# Patient Record
Sex: FEMALE | Race: WHITE | Employment: UNEMPLOYED | URBAN - METROPOLITAN AREA
[De-identification: names, ages, dates, MRNs, and addresses within clinical notes are randomized per-mention and may not be internally consistent; named-entity substitution may affect disease eponyms.]

---

## 2017-09-21 PROBLEM — R07.9 CHEST PAIN: Status: ACTIVE | Noted: 2017-09-21

## 2021-08-10 ENCOUNTER — HOSPITAL ENCOUNTER (EMERGENCY)
Age: 81
Discharge: HOME | End: 2021-08-10
Payer: MEDICARE

## 2021-08-10 VITALS
HEIGHT: 66 IN | OXYGEN SATURATION: 94 % | HEART RATE: 72 BPM | RESPIRATION RATE: 16 BRPM | TEMPERATURE: 97.5 F | WEIGHT: 135 LBS | DIASTOLIC BLOOD PRESSURE: 77 MMHG | SYSTOLIC BLOOD PRESSURE: 179 MMHG | BODY MASS INDEX: 21.69 KG/M2

## 2021-08-10 DIAGNOSIS — S05.01XA ABRASION OF RIGHT CORNEA, INITIAL ENCOUNTER: Primary | ICD-10-CM

## 2021-08-10 PROCEDURE — 6370000000 HC RX 637 (ALT 250 FOR IP): Performed by: PHYSICIAN ASSISTANT

## 2021-08-10 PROCEDURE — 99284 EMERGENCY DEPT VISIT MOD MDM: CPT

## 2021-08-10 RX ORDER — POLYMYXIN B SULFATE AND TRIMETHOPRIM 1; 10000 MG/ML; [USP'U]/ML
1 SOLUTION OPHTHALMIC EVERY 6 HOURS
Qty: 10 ML | Refills: 0 | Status: SHIPPED | OUTPATIENT
Start: 2021-08-10 | End: 2021-08-15

## 2021-08-10 RX ORDER — TETRACAINE HYDROCHLORIDE 5 MG/ML
2 SOLUTION OPHTHALMIC ONCE
Status: COMPLETED | OUTPATIENT
Start: 2021-08-10 | End: 2021-08-10

## 2021-08-10 RX ADMIN — TETRACAINE HYDROCHLORIDE 2 DROP: 5 SOLUTION/ DROPS OPHTHALMIC at 20:24

## 2021-08-10 RX ADMIN — FLUORESCEIN SODIUM 1 EACH: 0.6 STRIP OPHTHALMIC at 20:25

## 2021-08-10 ASSESSMENT — PAIN SCALES - GENERAL: PAINLEVEL_OUTOF10: 7

## 2021-08-11 NOTE — ED PROVIDER NOTES
[facility]  Department of Emergency Medicine   ED  Encounter Note  Admit Date/RoomTime: 8/10/2021  7:18 PM  ED Room: 32/    NAME: Clark Moore  : 1940  MRN: [id number]     Chief Complaint:  Eye Pain (right eye pain and swelling since 1400)    History of Present Illness        Clark Moore is a 78 y.o. old female presenting to the emergency department by private vehicle, for non-traumatic persistent pain and tearing to right eye, which began this afternoom hour(s) prior to arrival.  There has been no obvious mechanism causing complaint. Her daughter reports that patient started complaining of the pain after she woke up. Since onset her symptoms have been persistent and moderate in severity. Associated signs & symptoms of: blurry vision. The patients tetanus status is up to date. Patient has difficulty communicating due to dementia. Daughter is at bedside. She reports that her mother lives with her father and her father did give the patient some eyedrops from surgery she had 5 years ago, she has some with her and his prednisolone. She only had one dose today. Circumstances:    []  Contact Lens Use     []  Recent URI Sx's     []  Spontaneous Onset     []  Close Contact w/similar Sx's     []  Work Related     History of:     []   Glaucoma     []   Recent Eye Surgery     ROS   Pertinent positives and negatives are stated within HPI, all other systems reviewed and are negative. Past Medical History:  has a past medical history of Endometriosis, Kidney stones, Kidney stones, and Palpitations. Surgical History:  has a past surgical history that includes Appendectomy; Tonsillectomy; Ovary removal; hernia repair; and Upper gastrointestinal endoscopy (2017). Social History:  reports that she quit smoking about 60 years ago. Her smoking use included cigarettes. She has a 20.00 pack-year smoking history.  She has never used smokeless tobacco. She reports current alcohol use. She reports that she does not use drugs. Family History: family history includes Alzheimer's Disease in her mother; Cancer in her sister; Diabetes in her sister; Mental Illness in her father. Allergies: Demerol hcl [meperidine] and Ultram [tramadol]    Physical Exam   Oxygen Saturation Interpretation: Normal.        ED Triage Vitals [08/10/21 2000]   BP Temp Temp Source Pulse Resp SpO2 Height Weight   (!) 179/77 97.5 °F (36.4 °C) Temporal 72 16 94 % 5' 6\" (1.676 m) 135 lb (61.2 kg)         Constitutional:  Alert, development consistent with age. HENT:  NC/NT. Airway patent. Neck:  Normal ROM. Supple. Eyes:         Pupils: equal, round, reactive to light and accommodation. Eyelids: Bilateral upper and lower Swelling/redness:  no swelling or erythema. Conjunctiva: Right slight injection. Sclera: Bilateral normal appearing. Cornea: Right an abrasion is present which is approximately 4 mm at the 1 o'clock position. EOM:  Intact Bilaterally. Fundoscopic:  not well visualized. Visual Acuity: wnl  IOP right 17, left 14. Integument:  No rashes, erythema present, unless noted elsewhere. Lymphatics: No lymphangitis or adenopathy noted. Neurological:  Oriented. Motor functions intact. Lab / Imaging Results   (All laboratory and radiology results have been personally reviewed by myself)  Labs:  No results found for this visit on 08/10/21. Imaging: All Radiology results interpreted by Radiologist unless otherwise noted.   No orders to display       ED Course / Medical Decision Making     Medications   fluorescein ophthalmic strip 1 each (1 each Right Eye Given 8/10/21 2025)   tetracaine (TETRAVISC) 0.5 % ophthalmic solution 2 drop (2 drops Ophthalmic Given 8/10/21 2024)        Re-examination:  8/11/21       Time:pt had immediate pain relief with tetracaine    Consult(s):   None    Procedure(s):  SLIT LAMP EXAM:  Performed By: Stone Hilliard Carson Melchor PA-C. Right Eye: Cornea: an abrasion is present which is approximately 4 mm at the 1 o'clock position. Flourescein stain: Positive. Anterior chamber: normal.         MDM:   Patient presents to emergency with right eye pain. Minimal visual changes associated. Slit-lamp exam shows a fairly large corneal abrasion approximately 1:00 region of the right cornea. Patient probably scratched herself, either during sleep or without realizing it, as she does have fairly long fingernails. Intraocular pressures within normal limits. Patient will be discharged with antibiotics and follow-up with eye doctor tomorrow. Plan of Care/Counseling:  Alise Saldana PA-C reviewed today's visit with the patient and mother in addition to providing specific details for the plan of care and counseling regarding the diagnosis and prognosis. Questions are answered at this time and are agreeable with the plan. Assessment      1. Abrasion of right cornea, initial encounter      Plan   Discharged home. Patient condition is stable    New Medications     Discharge Medication List as of 8/10/2021  9:07 PM      START taking these medications    Details   trimethoprim-polymyxin b (POLYTRIM) 57357-9.1 UNIT/ML-% ophthalmic solution Place 1 drop into the right eye every 6 hours for 5 days, Disp-10 mL, R-0Normal           Electronically signed by Alise Saldana PA-C   DD: 8/11/21  **This report was transcribed using voice recognition software. Every effort was made to ensure accuracy; however, inadvertent computerized transcription errors may be present.   END OF ED PROVIDER NOTE       Alise Saldana PA-C  08/11/21 0335

## 2021-09-29 VITALS
RESPIRATION RATE: 16 BRPM | SYSTOLIC BLOOD PRESSURE: 173 MMHG | BODY MASS INDEX: 20.89 KG/M2 | DIASTOLIC BLOOD PRESSURE: 76 MMHG | OXYGEN SATURATION: 97 % | HEART RATE: 75 BPM | WEIGHT: 130 LBS | TEMPERATURE: 97.8 F | HEIGHT: 66 IN

## 2021-09-30 ENCOUNTER — HOSPITAL ENCOUNTER (EMERGENCY)
Age: 81
Discharge: LEFT BEFORE DISCHARGE | End: 2021-09-30
Payer: MEDICARE

## 2021-09-30 NOTE — ED NOTES
FIRST PROVIDER CONTACT ASSESSMENT NOTE      Department of Emergency Medicine   9/29/21  8:15 PM EDT    Chief Complaint: Swallowed Foreign Body (feels like pills are stuck in throat since taking meds Monday. Has been able to eat pudding.)      History of Present Illness:   Carlene Lindsay is a 78 y.o. female who presents to the ED for swelling to vitamin C pills on Monday. She states that it felt like it got stuck. She has been since then eating pudding. Denies any drooling. Denies any vomiting. Nuys any difficulties breathing. Medical History:  has a past medical history of Endometriosis, Kidney stones, Kidney stones, and Palpitations. Surgical History:  has a past surgical history that includes Appendectomy; Tonsillectomy; Ovary removal; hernia repair; and Upper gastrointestinal endoscopy (09/21/2017). Social History:  reports that she quit smoking about 60 years ago. Her smoking use included cigarettes. She has a 20.00 pack-year smoking history. She has never used smokeless tobacco. She reports current alcohol use. She reports that she does not use drugs. Family History: family history includes Alzheimer's Disease in her mother; Cancer in her sister; Diabetes in her sister; Mental Illness in her father.     *ALLERGIES*     Demerol hcl [meperidine] and Ultram [tramadol]     Physical Exam:      VS:  BP (!) 173/76   Pulse 75   Temp 97.8 °F (36.6 °C)   Resp 16   Ht 5' 6\" (1.676 m)   Wt 130 lb (59 kg)   LMP  (LMP Unknown)   SpO2 97%   BMI 20.98 kg/m²      Initial Plan of Care:  Initiate Treatment-Testing, Proceed toTreatment Area When Bed Available for ED Attending/MLP to Continue Care    -----------------END OF FIRST PROVIDER CONTACT ASSESSMENT NOTE--------------  Electronically signed by ANGELITO Lopez CNP   DD: 9/29/21             ANGELITO Lopez CNP  09/29/21 2017

## 2022-08-17 ENCOUNTER — HOSPITAL ENCOUNTER (EMERGENCY)
Age: 82
Discharge: HOME | End: 2022-08-17
Attending: EMERGENCY MEDICINE
Payer: MEDICARE

## 2022-08-17 ENCOUNTER — APPOINTMENT (OUTPATIENT)
Dept: CT IMAGING | Age: 82
End: 2022-08-17
Payer: MEDICARE

## 2022-08-17 VITALS
HEART RATE: 69 BPM | RESPIRATION RATE: 16 BRPM | DIASTOLIC BLOOD PRESSURE: 62 MMHG | BODY MASS INDEX: 15.82 KG/M2 | OXYGEN SATURATION: 96 % | TEMPERATURE: 97.6 F | SYSTOLIC BLOOD PRESSURE: 114 MMHG | WEIGHT: 98 LBS

## 2022-08-17 DIAGNOSIS — S01.01XA LACERATION OF SCALP, INITIAL ENCOUNTER: Primary | ICD-10-CM

## 2022-08-17 DIAGNOSIS — Z91.81 HISTORY OF RECENT FALL: ICD-10-CM

## 2022-08-17 DIAGNOSIS — S09.90XA CLOSED HEAD INJURY, INITIAL ENCOUNTER: ICD-10-CM

## 2022-08-17 LAB
AMORPHOUS: ABNORMAL
ANION GAP SERPL CALCULATED.3IONS-SCNC: 10 MMOL/L (ref 7–16)
BACTERIA: ABNORMAL /HPF
BASOPHILS ABSOLUTE: 0.05 E9/L (ref 0–0.2)
BASOPHILS RELATIVE PERCENT: 0.7 % (ref 0–2)
BILIRUBIN URINE: NEGATIVE
BLOOD, URINE: NEGATIVE
BUN BLDV-MCNC: 11 MG/DL (ref 6–23)
CALCIUM SERPL-MCNC: 9.5 MG/DL (ref 8.6–10.2)
CHLORIDE BLD-SCNC: 104 MMOL/L (ref 98–107)
CLARITY: CLEAR
CO2: 26 MMOL/L (ref 22–29)
COLOR: YELLOW
CREAT SERPL-MCNC: 1 MG/DL (ref 0.5–1)
EOSINOPHILS ABSOLUTE: 0.07 E9/L (ref 0.05–0.5)
EOSINOPHILS RELATIVE PERCENT: 1 % (ref 0–6)
EPITHELIAL CELLS, UA: ABNORMAL /HPF
GFR AFRICAN AMERICAN: >60
GFR NON-AFRICAN AMERICAN: 53 ML/MIN/1.73
GLUCOSE BLD-MCNC: 116 MG/DL (ref 74–99)
GLUCOSE URINE: NEGATIVE MG/DL
HCT VFR BLD CALC: 41.4 % (ref 34–48)
HEMOGLOBIN: 14.2 G/DL (ref 11.5–15.5)
IMMATURE GRANULOCYTES #: 0.03 E9/L
IMMATURE GRANULOCYTES %: 0.4 % (ref 0–5)
KETONES, URINE: NEGATIVE MG/DL
LACTIC ACID: 1 MMOL/L (ref 0.5–2.2)
LEUKOCYTE ESTERASE, URINE: NEGATIVE
LYMPHOCYTES ABSOLUTE: 0.7 E9/L (ref 1.5–4)
LYMPHOCYTES RELATIVE PERCENT: 9.8 % (ref 20–42)
MCH RBC QN AUTO: 32.1 PG (ref 26–35)
MCHC RBC AUTO-ENTMCNC: 34.3 % (ref 32–34.5)
MCV RBC AUTO: 93.5 FL (ref 80–99.9)
MONOCYTES ABSOLUTE: 0.49 E9/L (ref 0.1–0.95)
MONOCYTES RELATIVE PERCENT: 6.9 % (ref 2–12)
NEUTROPHILS ABSOLUTE: 5.8 E9/L (ref 1.8–7.3)
NEUTROPHILS RELATIVE PERCENT: 81.2 % (ref 43–80)
NITRITE, URINE: NEGATIVE
PDW BLD-RTO: 13.8 FL (ref 11.5–15)
PH UA: 7 (ref 5–9)
PLATELET # BLD: 174 E9/L (ref 130–450)
PMV BLD AUTO: 9.8 FL (ref 7–12)
POTASSIUM SERPL-SCNC: 3.8 MMOL/L (ref 3.5–5)
PROTEIN UA: NEGATIVE MG/DL
RBC # BLD: 4.43 E12/L (ref 3.5–5.5)
RBC UA: ABNORMAL /HPF (ref 0–2)
SODIUM BLD-SCNC: 140 MMOL/L (ref 132–146)
SPECIFIC GRAVITY UA: 1.01 (ref 1–1.03)
TROPONIN, HIGH SENSITIVITY: 12 NG/L (ref 0–9)
TROPONIN, HIGH SENSITIVITY: 14 NG/L (ref 0–9)
UROBILINOGEN, URINE: 2 E.U./DL
WBC # BLD: 7.1 E9/L (ref 4.5–11.5)
WBC UA: ABNORMAL /HPF (ref 0–5)

## 2022-08-17 PROCEDURE — 85025 COMPLETE CBC W/AUTO DIFF WBC: CPT

## 2022-08-17 PROCEDURE — 12001 RPR S/N/AX/GEN/TRNK 2.5CM/<: CPT

## 2022-08-17 PROCEDURE — 93005 ELECTROCARDIOGRAM TRACING: CPT | Performed by: EMERGENCY MEDICINE

## 2022-08-17 PROCEDURE — 72192 CT PELVIS W/O DYE: CPT

## 2022-08-17 PROCEDURE — 72125 CT NECK SPINE W/O DYE: CPT

## 2022-08-17 PROCEDURE — 6360000002 HC RX W HCPCS: Performed by: EMERGENCY MEDICINE

## 2022-08-17 PROCEDURE — 70450 CT HEAD/BRAIN W/O DYE: CPT

## 2022-08-17 PROCEDURE — 81001 URINALYSIS AUTO W/SCOPE: CPT

## 2022-08-17 PROCEDURE — 2580000003 HC RX 258: Performed by: EMERGENCY MEDICINE

## 2022-08-17 PROCEDURE — 90714 TD VACC NO PRESV 7 YRS+ IM: CPT | Performed by: EMERGENCY MEDICINE

## 2022-08-17 PROCEDURE — 90471 IMMUNIZATION ADMIN: CPT | Performed by: EMERGENCY MEDICINE

## 2022-08-17 PROCEDURE — 84484 ASSAY OF TROPONIN QUANT: CPT

## 2022-08-17 PROCEDURE — 80048 BASIC METABOLIC PNL TOTAL CA: CPT

## 2022-08-17 PROCEDURE — 6370000000 HC RX 637 (ALT 250 FOR IP): Performed by: EMERGENCY MEDICINE

## 2022-08-17 PROCEDURE — 36415 COLL VENOUS BLD VENIPUNCTURE: CPT

## 2022-08-17 PROCEDURE — 99284 EMERGENCY DEPT VISIT MOD MDM: CPT

## 2022-08-17 PROCEDURE — 83605 ASSAY OF LACTIC ACID: CPT

## 2022-08-17 PROCEDURE — 72131 CT LUMBAR SPINE W/O DYE: CPT

## 2022-08-17 RX ORDER — SODIUM CHLORIDE 9 MG/ML
500 INJECTION, SOLUTION INTRAVENOUS CONTINUOUS
Status: DISCONTINUED | OUTPATIENT
Start: 2022-08-17 | End: 2022-08-18 | Stop reason: HOSPADM

## 2022-08-17 RX ORDER — TETANUS AND DIPHTHERIA TOXOIDS ADSORBED 2; 2 [LF]/.5ML; [LF]/.5ML
0.5 INJECTION INTRAMUSCULAR ONCE
Status: COMPLETED | OUTPATIENT
Start: 2022-08-17 | End: 2022-08-17

## 2022-08-17 RX ORDER — ACETAMINOPHEN 160 MG/5ML
480 SOLUTION ORAL ONCE
Status: COMPLETED | OUTPATIENT
Start: 2022-08-17 | End: 2022-08-17

## 2022-08-17 RX ADMIN — ACETAMINOPHEN 480 MG: 650 SOLUTION ORAL at 19:05

## 2022-08-17 RX ADMIN — TETANUS AND DIPHTHERIA TOXOIDS ADSORBED 0.5 ML: 2; 2 INJECTION INTRAMUSCULAR at 19:08

## 2022-08-17 RX ADMIN — SODIUM CHLORIDE 500 ML: 9 INJECTION, SOLUTION INTRAVENOUS at 19:00

## 2022-08-17 ASSESSMENT — PAIN DESCRIPTION - LOCATION
LOCATION: HEAD
LOCATION: HEAD;NECK

## 2022-08-17 ASSESSMENT — PAIN SCALES - GENERAL: PAINLEVEL_OUTOF10: 8

## 2022-08-17 ASSESSMENT — PAIN - FUNCTIONAL ASSESSMENT: PAIN_FUNCTIONAL_ASSESSMENT: 0-10

## 2022-08-17 ASSESSMENT — PAIN DESCRIPTION - DESCRIPTORS: DESCRIPTORS: SORE

## 2022-08-17 ASSESSMENT — PAIN DESCRIPTION - PAIN TYPE: TYPE: ACUTE PAIN

## 2022-08-17 NOTE — ED PROVIDER NOTES
HPI:  8/17/22, Time: 6:20 PM EDT        Dawit Sharma is a 78 y.o. female presenting to the ED for evaluation of head injury which occurred acutely from a fall at home, beginning 1 hour ago. Patient is here with her daughter who provides most of the historical detail as the patient has a history of documented dementia. Daughter states that the patient stepped on a scale which caused her to lose her balance falling backward. Daughter states that the patient   was alone at the time of the accident so it is undetermined whether the patient had any true loss of consciousness. Per daughter's report, the patient is back to her baseline level of functioning in terms of mental status. The patient did sustain a laceration of the posterior scalp and the patient has voiced complaints regarding intermittent neck pain. Patient is not had any complaints of back pain and denies any back pain currently, no abdominal pain, no nausea/vomiting, no change in bowel/bladder habit patterns reported, no extremity injuries reported. Patient's tetanus status is unknown. The complaint has been persistent, moderate in severity, and worsened by palpation of the scalp. No relieving factors are reported    Review of Systems:   A complete review of systems was performed and pertinent positives and negatives are stated within HPI, all other systems reviewed and are negative.    --------------------------------------------- PAST HISTORY ---------------------------------------------  Past Medical History:  has a past medical history of Endometriosis, Frontal lobe dementia ([facility] [address] Kidney stones, Kidney stones, Palpitations, and Thyroid disease. Past Surgical History:  has a past surgical history that includes Appendectomy; Tonsillectomy; Ovary removal; hernia repair; and Upper gastrointestinal endoscopy (09/21/2017). Social History:  reports that she quit smoking about 61 years ago. Her smoking use included cigarettes.  She has a 20.00 pack-year smoking history. She has never used smokeless tobacco. She reports current alcohol use. She reports that she does not use drugs. Family History: family history includes Alzheimer's Disease in her mother; Cancer in her sister; Diabetes in her sister; Mental Illness in her father. The patients home medications have been reviewed.     Allergies: Demerol hcl [meperidine] and Ultram [tramadol]    -------------------------------------------------- RESULTS -------------------------------------------------  All laboratory and radiology results have been personally reviewed by myself   LABS:  Results for orders placed or performed during the hospital encounter of 08/17/22   Urinalysis with Microscopic   Result Value Ref Range    Color, UA Yellow Straw/Yellow    Clarity, UA Clear Clear    Glucose, Ur Negative Negative mg/dL    Bilirubin Urine Negative Negative    Ketones, Urine Negative Negative mg/dL    Specific Gravity, UA 1.015 1.005 - 1.030    Blood, Urine Negative Negative    pH, UA 7.0 5.0 - 9.0    Protein, UA Negative Negative mg/dL    Urobilinogen, Urine 2.0 (A) <2.0 E.U./dL    Nitrite, Urine Negative Negative    Leukocyte Esterase, Urine Negative Negative    WBC, UA 0-1 0 - 5 /HPF    RBC, UA 0-1 0 - 2 /HPF    Epithelial Cells, UA RARE /HPF    Bacteria, UA RARE (A) None Seen /HPF    Amorphous, UA MODERATE    Troponin   Result Value Ref Range    Troponin, High Sensitivity 12 (H) 0 - 9 ng/L   CBC with Auto Differential   Result Value Ref Range    WBC 7.1 4.5 - 11.5 E9/L    RBC 4.43 3.50 - 5.50 E12/L    Hemoglobin 14.2 11.5 - 15.5 g/dL    Hematocrit 41.4 34.0 - 48.0 %    MCV 93.5 80.0 - 99.9 fL    MCH 32.1 26.0 - 35.0 pg    MCHC 34.3 32.0 - 34.5 %    RDW 13.8 11.5 - 15.0 fL    Platelets 181 642 - 733 E9/L    MPV 9.8 7.0 - 12.0 fL    Neutrophils % 81.2 (H) 43.0 - 80.0 %    Immature Granulocytes % 0.4 0.0 - 5.0 %    Lymphocytes % 9.8 (L) 20.0 - 42.0 %    Monocytes % 6.9 2.0 - 12.0 %    Eosinophils % 1.0 0.0 - 6.0 %    Basophils % 0.7 0.0 - 2.0 %    Neutrophils Absolute 5.80 1.80 - 7.30 E9/L    Immature Granulocytes # 0.03 E9/L    Lymphocytes Absolute 0.70 (L) 1.50 - 4.00 E9/L    Monocytes Absolute 0.49 0.10 - 0.95 E9/L    Eosinophils Absolute 0.07 0.05 - 0.50 E9/L    Basophils Absolute 0.05 0.00 - 0.20 J9/L   Basic Metabolic Panel   Result Value Ref Range    Sodium 140 132 - 146 mmol/L    Potassium 3.8 3.5 - 5.0 mmol/L    Chloride 104 98 - 107 mmol/L    CO2 26 22 - 29 mmol/L    Anion Gap 10 7 - 16 mmol/L    Glucose 116 (H) 74 - 99 mg/dL    BUN 11 6 - 23 mg/dL    Creatinine 1.0 0.5 - 1.0 mg/dL    GFR Non-African American 53 >=60 mL/min/1.73    GFR African American >60     Calcium 9.5 8.6 - 10.2 mg/dL   Lactic Acid   Result Value Ref Range    Lactic Acid 1.0 0.5 - 2.2 mmol/L   Troponin   Result Value Ref Range    Troponin, High Sensitivity 14 (H) 0 - 9 ng/L   EKG 12 Lead   Result Value Ref Range    Ventricular Rate 66 BPM    Atrial Rate 66 BPM    P-R Interval 166 ms    QRS Duration 92 ms    Q-T Interval 436 ms    QTc Calculation (Bazett) 457 ms    P Axis 70 degrees    R Axis 51 degrees    T Axis 53 degrees       RADIOLOGY:  Interpreted by Radiologist.  CT HEAD WO CONTRAST   Final Result   No acute intracranial abnormality. There is age-appropriate atrophy and   small-vessel ischemic disease. CT lumbar spine. There is no acute fracture or dislocation in the lumbar spine. There is   diffuse degenerative changes from L1-S1 with multilevel disc bulges. There   is calcification of the aorta. Impression      No acute fractures. Diffuse degenerative changes from L1-S1. CT pelvis. .      There is no acute displaced fracture in the pelvis. Mild degenerative   changes are noted in the hips and lumbosacral spine. Scattered   diverticulosis are identified in the colon. Impression      No acute displaced pelvic fractures.          CT CERVICAL SPINE WO CONTRAST   Final Result   No acute abnormality of the cervical spine. Mild-moderate cervical spondylosis. CT PELVIS WO CONTRAST Additional Contrast? None   Final Result   No acute intracranial abnormality. There is age-appropriate atrophy and   small-vessel ischemic disease. CT lumbar spine. There is no acute fracture or dislocation in the lumbar spine. There is   diffuse degenerative changes from L1-S1 with multilevel disc bulges. There   is calcification of the aorta. Impression      No acute fractures. Diffuse degenerative changes from L1-S1. CT pelvis. .      There is no acute displaced fracture in the pelvis. Mild degenerative   changes are noted in the hips and lumbosacral spine. Scattered   diverticulosis are identified in the colon. Impression      No acute displaced pelvic fractures. CT LUMBAR SPINE WO CONTRAST   Final Result   No acute intracranial abnormality. There is age-appropriate atrophy and   small-vessel ischemic disease. CT lumbar spine. There is no acute fracture or dislocation in the lumbar spine. There is   diffuse degenerative changes from L1-S1 with multilevel disc bulges. There   is calcification of the aorta. Impression      No acute fractures. Diffuse degenerative changes from L1-S1. CT pelvis. .      There is no acute displaced fracture in the pelvis. Mild degenerative   changes are noted in the hips and lumbosacral spine. Scattered   diverticulosis are identified in the colon. Impression      No acute displaced pelvic fractures. ------------------------- NURSING NOTES AND VITALS REVIEWED ---------------------------   The nursing notes within the ED encounter and vital signs as below have been reviewed.    /62   Pulse 69   Temp 97.6 °F (36.4 °C) (Temporal)   Resp 16   Wt 98 lb (44.5 kg)   LMP  (LMP Unknown)   SpO2 96%   BMI 15.82 kg/m²   Oxygen Saturation Interpretation: Normal      ---------------------------------------------------PHYSICAL EXAM--------------------------------------      Constitutional/General: Alert and oriented x person, elderly, frail appearing, non toxic in mild distress  Head:   2.5 cm scalp laceration R Occiput; no raccoon eye sign, no warner sign, no scalp hematomas, no hemotympanum  Eyes: PERRL, EOMI no subconjunctival hemorrhage  Mouth: Oropharynx clear, handling secretions, no trismus  Neck: Supple, full ROM, mild paracervical tenderness but no true midline vertebral tenderness, no step-off no crepitus  Pulmonary: Lungs clear to auscultation bilaterally, no wheezes, rales, or rhonchi. Not in respiratory distress; no chest wall tenderness during auscultation  Cardiovascular:  Regular rate and rhythm, no murmurs, gallops, or rubs. 2+ distal pulses  GI: Abdomen soft non tender, non distended, no organomegaly no masses no guarding or rigidity normal active bowel sounds  Extremities: Moves all extremities x 4. Warm and well perfused; no clubbing, no cyanosis, no edema  Skin: warm and dry without rash  Neurologic: GCS 15, cranial nerves II through XII grossly intact with no focal neurologic deficits. Patient appears to be at baseline mental status (confirmed by her daughter)  Psych: Normal Affect      ------------------------------ ED COURSE/MEDICAL DECISION MAKING----------------------  Medications   0.9 % sodium chloride infusion (500 mLs IntraVENous New Bag 8/17/22 1900)   acetaminophen (TYLENOL) 160 MG/5ML solution 480 mg (480 mg Oral Given 8/17/22 1905)   diptheria-tetanus toxoids [facility]) 2-2 LF/0.5ML injection 0.5 mL (0.5 mLs IntraMUSCular Given 8/17/22 1908)         ED COURSE:     Medical Decision Making:   Differential diagnoses includes:  Closed head injury/concussion, medic SAH, subdural hematoma, epidural hematoma, cervical tibial fracture, lumbar vertebral fracture, occult pelvic fracture, to name a few.   CT head/C-spine/lumbar spine/pelvis studies were negative for any evidence of any fractures or any acute process. The patient is felt to be stable appropriate discharge close outpatient follow-up. PROCEDURE NOTE  8/17/22           LACERATION REPAIR  Risks, benefits and alternatives (for applicable procedures below) described. Performed By: [facility] under direct supervision of Lidya Pham MD.  Informed consent: Verbal consent obtained. The legal guardian was counseled regarding the procedure in person, it's indications, risks, potential complications and alternatives and any questions were answered. Verbal consent was obtained. Laceration #: 1. Location: R Occiput  Length: 2.5 cm. The wound area was irrigated with sterile saline, cleansed with chlorhexidine gluconate and draped in a sterile fashion. Local Anesthesia:  obtained with Lidocaine 1% with epinephrine. The wound was explored with the following results: Thickness: full thickness. no foreign body or tendon injury seen. Debridement: None. Undermining: None. Wound Margins Revised: None. Flaps Aligned: yes. The wound was closed with #3 staple(s). Dressing:  bacitracin. There were no additional wounds requiring formal closure. Counseling: The emergency provider has spoken with the patient and family member patient and daughter and discussed todays results, in addition to providing specific details for the plan of care and counseling regarding the diagnosis and prognosis. Questions are answered at this time and they are agreeable with the plan.    --------------------------------- IMPRESSION AND DISPOSITION ---------------------------------    IMPRESSION  1. Laceration of scalp, initial encounter    2. Closed head injury, initial encounter    3. History of recent fall        DISPOSITION  Disposition: Discharge to home  Patient condition is stable      NOTE: This report was transcribed using voice recognition software.  Every effort was made to ensure accuracy; however, inadvertent computerized transcription errors may be present        Mo Grimes MD  08/18/22 [id number]

## 2022-08-18 LAB
EKG ATRIAL RATE: 66 BPM
EKG P AXIS: 70 DEGREES
EKG P-R INTERVAL: 166 MS
EKG Q-T INTERVAL: 436 MS
EKG QRS DURATION: 92 MS
EKG QTC CALCULATION (BAZETT): 457 MS
EKG R AXIS: 51 DEGREES
EKG T AXIS: 53 DEGREES
EKG VENTRICULAR RATE: 66 BPM

## 2022-08-18 NOTE — DISCHARGE INSTRUCTIONS
NOTE:  Get IMMEDIATE medical attention if any new/worsening symptoms occur. Cleanse the wound twice a day with soap and water and apply Neosporin ointment.